# Patient Record
Sex: FEMALE | Race: WHITE | Employment: FULL TIME | URBAN - METROPOLITAN AREA
[De-identification: names, ages, dates, MRNs, and addresses within clinical notes are randomized per-mention and may not be internally consistent; named-entity substitution may affect disease eponyms.]

---

## 2017-01-03 ENCOUNTER — OFFICE VISIT (OUTPATIENT)
Dept: INTERNAL MEDICINE CLINIC | Facility: CLINIC | Age: 57
End: 2017-01-03

## 2017-01-03 VITALS
HEIGHT: 63 IN | HEART RATE: 60 BPM | RESPIRATION RATE: 16 BRPM | DIASTOLIC BLOOD PRESSURE: 64 MMHG | BODY MASS INDEX: 28.38 KG/M2 | TEMPERATURE: 99 F | SYSTOLIC BLOOD PRESSURE: 112 MMHG | WEIGHT: 160.19 LBS

## 2017-01-03 DIAGNOSIS — E03.9 HYPOTHYROIDISM, UNSPECIFIED TYPE: ICD-10-CM

## 2017-01-03 DIAGNOSIS — F41.9 ANXIETY: ICD-10-CM

## 2017-01-03 DIAGNOSIS — K60.2 ANAL FISSURE: ICD-10-CM

## 2017-01-03 DIAGNOSIS — Z87.898 HISTORY OF DIARRHEA: ICD-10-CM

## 2017-01-03 DIAGNOSIS — K59.00 CONSTIPATION, UNSPECIFIED CONSTIPATION TYPE: Primary | ICD-10-CM

## 2017-01-03 PROCEDURE — 99214 OFFICE O/P EST MOD 30 MIN: CPT | Performed by: FAMILY MEDICINE

## 2017-01-03 RX ORDER — LEVOTHYROXINE SODIUM 112 UG/1
TABLET ORAL
Qty: 90 TABLET | Refills: 2 | Status: SHIPPED | OUTPATIENT
Start: 2017-01-03 | End: 2017-09-25

## 2017-01-03 RX ORDER — ATORVASTATIN CALCIUM 40 MG/1
TABLET, FILM COATED ORAL
COMMUNITY
End: 2017-01-03

## 2017-01-03 RX ORDER — HYOSCYAMINE SULFATE 0.125 MG
125 TABLET ORAL
COMMUNITY
End: 2017-01-03

## 2017-01-03 RX ORDER — LEVOTHYROXINE SODIUM 175 UG/1
TABLET ORAL
COMMUNITY
End: 2017-01-03

## 2017-01-03 NOTE — PROGRESS NOTES
HPI:    Patient ID: Melecio Schuler is a 64year old female. HPI Here for f/u from ER visit to [facility] on 12/31 due to constipation and resulting fissure. Patient had been meaning to see GI as recommended for chronic diarrhea but did not make appt yet.  Had Disp: 36 mL Rfl: 0   Atorvastatin Calcium 10 MG Oral Tab Take 1 tablet (10 mg total) by mouth daily. Disp: 90 tablet Rfl: 1   aspirin 81 MG Oral Tab Take 81 mg by mouth daily. Disp:  Rfl:    Cholecalciferol (VITAMIN D OR) Take  by mouth.  Disp:  Rfl:      A

## 2017-01-03 NOTE — PATIENT INSTRUCTIONS
Eating a High-Fiber Diet  Fiber is what gives strength and structure to plants. Most grains, beans, vegetables, and fruits contain fiber. Foods rich in fiber are often low in calories and fat, and they fill you up more.  They may also reduce your risks fo Keep track of how much fiber you eat. Start by reading food labels. Then eat a variety of foods high in fiber.  As you begin to eat more fiber, ask your healthcare provider how much water you should be drinking to keep your digestive system working smoothly

## 2017-03-30 RX ORDER — ATORVASTATIN CALCIUM 10 MG/1
TABLET, FILM COATED ORAL
Qty: 90 TABLET | Refills: 2 | Status: SHIPPED | OUTPATIENT
Start: 2017-03-30 | End: 2017-11-27

## 2017-04-18 RX ORDER — DULOXETIN HYDROCHLORIDE 20 MG/1
CAPSULE, DELAYED RELEASE ORAL
Qty: 90 CAPSULE | Refills: 1 | Status: SHIPPED | OUTPATIENT
Start: 2017-04-18 | End: 2017-11-27

## 2017-09-25 RX ORDER — LEVOTHYROXINE SODIUM 112 UG/1
TABLET ORAL
Qty: 90 TABLET | Refills: 0 | Status: SHIPPED | OUTPATIENT
Start: 2017-09-25 | End: 2017-11-27

## 2017-10-16 PROCEDURE — 87624 HPV HI-RISK TYP POOLED RSLT: CPT | Performed by: OBSTETRICS & GYNECOLOGY

## 2017-10-16 PROCEDURE — 88175 CYTOPATH C/V AUTO FLUID REDO: CPT | Performed by: OBSTETRICS & GYNECOLOGY

## 2017-11-27 DIAGNOSIS — Z13.1 SCREENING FOR DIABETES MELLITUS (DM): ICD-10-CM

## 2017-11-27 DIAGNOSIS — Z13.0 SCREENING FOR DEFICIENCY ANEMIA: ICD-10-CM

## 2017-11-27 DIAGNOSIS — E03.9 HYPOTHYROIDISM, UNSPECIFIED TYPE: ICD-10-CM

## 2017-11-27 DIAGNOSIS — E78.5 HYPERLIPIDEMIA, UNSPECIFIED HYPERLIPIDEMIA TYPE: Primary | ICD-10-CM

## 2017-11-27 RX ORDER — DULOXETIN HYDROCHLORIDE 20 MG/1
20 CAPSULE, DELAYED RELEASE ORAL
Qty: 90 CAPSULE | Refills: 1 | Status: SHIPPED
Start: 2017-11-27 | End: 2018-12-27

## 2017-11-27 RX ORDER — LEVOTHYROXINE SODIUM 112 UG/1
112 TABLET ORAL
Qty: 90 TABLET | Refills: 0 | Status: SHIPPED
Start: 2017-11-27 | End: 2018-03-27

## 2017-11-27 RX ORDER — ATORVASTATIN CALCIUM 10 MG/1
10 TABLET, FILM COATED ORAL
Qty: 90 TABLET | Refills: 0 | Status: SHIPPED
Start: 2017-11-27 | End: 2018-03-27

## 2017-11-27 RX ORDER — MORPHINE 10 MG/ML
0.4 TINCTURE ORAL 3 TIMES DAILY PRN
Qty: 36 ML | Refills: 0
Start: 2017-11-27 | End: 2018-06-07

## 2017-11-27 NOTE — TELEPHONE ENCOUNTER
Medication(s) to Refill:   Pending Prescriptions Disp Refills    opium 10 MG/ML (1%) Oral Tincture 36 mL 0     Sig: Take 0.4 mL (4 mg total) by mouth 3 (three) times daily as needed.       atorvastatin 10 MG Oral Tab 90 tablet 2     Sig: Take 1 tablet (10 m 10/12/2016   LYMABS 1.83 10/12/2016   MOABSO 0.47 10/12/2016   EOABSO 0.09 10/12/2016   BAABSO 0.04 10/12/2016         Lab Results  Component Value Date   GLU 85 10/12/2016   BUN 15 10/12/2016   CREATSERUM 0.82 10/12/2016   GFR 80 10/12/2016   CA 9.2 10/12

## 2017-11-27 NOTE — TELEPHONE ENCOUNTER
Please call patient. She is due for fasting labs and office visit for further refills of her medication. Orders are in.

## 2017-11-27 NOTE — TELEPHONE ENCOUNTER
From: Didier Cough  Sent: 11/27/2017 12:16 PM CST  Subject: Medication Renewal Request    Ed Carcamo.  Shirlene Oneill would like a refill of the following medications:     opium 10 MG/ML (1%) Oral Tincture Link Hazard, DO]     ATORVASTATIN CALCIUM 10 MG Oral T

## 2017-12-20 ENCOUNTER — OFFICE VISIT (OUTPATIENT)
Dept: INTERNAL MEDICINE CLINIC | Facility: CLINIC | Age: 57
End: 2017-12-20

## 2017-12-20 ENCOUNTER — LAB ENCOUNTER (OUTPATIENT)
Dept: LAB | Age: 57
End: 2017-12-20
Attending: FAMILY MEDICINE
Payer: COMMERCIAL

## 2017-12-20 VITALS
SYSTOLIC BLOOD PRESSURE: 110 MMHG | TEMPERATURE: 98 F | BODY MASS INDEX: 27 KG/M2 | RESPIRATION RATE: 16 BRPM | HEART RATE: 70 BPM | WEIGHT: 155 LBS | DIASTOLIC BLOOD PRESSURE: 64 MMHG

## 2017-12-20 DIAGNOSIS — E03.9 HYPOTHYROIDISM, UNSPECIFIED TYPE: ICD-10-CM

## 2017-12-20 DIAGNOSIS — E78.5 HYPERLIPIDEMIA, UNSPECIFIED HYPERLIPIDEMIA TYPE: ICD-10-CM

## 2017-12-20 DIAGNOSIS — Z13.1 SCREENING FOR DIABETES MELLITUS (DM): ICD-10-CM

## 2017-12-20 DIAGNOSIS — Z00.00 ANNUAL PHYSICAL EXAM: Primary | ICD-10-CM

## 2017-12-20 PROCEDURE — 90686 IIV4 VACC NO PRSV 0.5 ML IM: CPT | Performed by: FAMILY MEDICINE

## 2017-12-20 PROCEDURE — 90471 IMMUNIZATION ADMIN: CPT | Performed by: FAMILY MEDICINE

## 2017-12-20 PROCEDURE — 99396 PREV VISIT EST AGE 40-64: CPT | Performed by: FAMILY MEDICINE

## 2017-12-20 PROCEDURE — 80061 LIPID PANEL: CPT | Performed by: FAMILY MEDICINE

## 2017-12-20 PROCEDURE — 84443 ASSAY THYROID STIM HORMONE: CPT | Performed by: FAMILY MEDICINE

## 2017-12-20 PROCEDURE — 80053 COMPREHEN METABOLIC PANEL: CPT | Performed by: FAMILY MEDICINE

## 2017-12-20 NOTE — PATIENT INSTRUCTIONS
Prevention Guidelines, Women Ages 48 to 59  Screening tests and vaccines are an important part of managing your health. Health counseling is essential, too. Below are guidelines for these, for women ages 48 to 59.  Talk with your healthcare provider to ma Lung cancer Adults age 54 to [de-identified] who have smoked Yearly screening in smokers with 30 pack-year history of smoking or who quit within 15 years   Obesity All women in this age group At routine exams   Osteoporosis Women who are postmenopausal Ask your healthc PPSV23: 1 to 2 doses through age 59, or 1 dose at 72 or older (protects against 23 types of pneumococcal bacteria)   Tetanus/diphtheria/pertussis (Td/Tdap) booster All women in this age group Td every 10 years, or a one-time dose of Tdap instead of a Td mattie

## 2017-12-21 NOTE — PROGRESS NOTES
HPI:    Patient ID: Dulce Patel is a 62year old female. HPI Here for annual check-up. Patient has Gyne and is up to date on pap and mammogram. Up to date on colonoscopy. Trying to eat healthy and exercise. Is busy with [address] work. No    Sexual activity: Not on file     Other Topics Concern   None on file     Social History Narrative   None on file     Family History   Problem Relation Age of Onset   • Heart Disorder Father      heart disease   • Breast Cancer Mother    • Heart Disea Cholecalciferol (VITAMIN D OR) Take  by mouth. Disp:  Rfl:      Allergies:No Known Allergies   PHYSICAL EXAM:   Physical Exam   Constitutional: She appears well-developed and well-nourished. HENT:   Head: Normocephalic and atraumatic.    Right Ear: Tymp

## 2018-03-27 NOTE — TELEPHONE ENCOUNTER
Medication(s) to Refill:   Pending Prescriptions Disp Refills    Levothyroxine Sodium 112 MCG Oral Tab 90 tablet 0     Sig: Take 1 tablet (112 mcg total) by mouth once daily.       atorvastatin 10 MG Oral Tab 90 tablet 0     Sig: Take 1 tablet (10 mg total)

## 2018-03-28 RX ORDER — LEVOTHYROXINE SODIUM 112 UG/1
112 TABLET ORAL
Qty: 90 TABLET | Refills: 2 | Status: SHIPPED | OUTPATIENT
Start: 2018-03-28 | End: 2018-12-27

## 2018-03-28 RX ORDER — ATORVASTATIN CALCIUM 10 MG/1
10 TABLET, FILM COATED ORAL
Qty: 90 TABLET | Refills: 2 | Status: SHIPPED | OUTPATIENT
Start: 2018-03-28 | End: 2018-12-27

## 2018-06-07 ENCOUNTER — TELEPHONE (OUTPATIENT)
Dept: INTERNAL MEDICINE CLINIC | Facility: CLINIC | Age: 58
End: 2018-06-07

## 2018-06-08 RX ORDER — MORPHINE 10 MG/ML
0.4 TINCTURE ORAL 3 TIMES DAILY PRN
Qty: 36 ML | Refills: 0 | Status: SHIPPED | OUTPATIENT
Start: 2018-06-08 | End: 2018-07-23

## 2018-07-12 ENCOUNTER — TELEPHONE (OUTPATIENT)
Dept: INTERNAL MEDICINE CLINIC | Facility: CLINIC | Age: 58
End: 2018-07-12

## 2018-07-12 NOTE — TELEPHONE ENCOUNTER
We did receive thru fax from patient the letter she states we can not sent it thru fax or electronically that it needs to be mailed in please advice. Placing letter in your basket.

## 2018-07-12 NOTE — TELEPHONE ENCOUNTER
Re opium 10 MG/ML (1%) Oral Tincture. . Needs refill, copy sent in June, was not accepted by [facility], Pt unaware why it was not accepted     Address if need to be mailed.      [facility] [address]  [address] [address] [facility], [address]    Pt will be faxing locio

## 2018-07-14 NOTE — TELEPHONE ENCOUNTER
Please call patient. It was my understanding when I first started seeing patient that she was going to f/u with GI regarding her Rx for tincture of opium and that if they felt she should continue taking it PRN then they would refill it.

## 2018-07-16 NOTE — TELEPHONE ENCOUNTER
Patient states she has been taking this medication for 10 years, ordered by her MD in [address] [place] [address], states she did see Dr. Zachery Rowe 2/2017 and told her to keep same management, doesn't have a 'working relationship' with him.   Pt states her PCP before [facility]

## 2018-07-23 RX ORDER — MORPHINE 10 MG/ML
0.6 TINCTURE ORAL 2 TIMES DAILY PRN
Qty: 36 ML | Refills: 0 | Status: SHIPPED | OUTPATIENT
Start: 2018-07-23 | End: 2018-08-22

## 2018-07-23 NOTE — TELEPHONE ENCOUNTER
Called pt back. Advised that she needs to  rx and paperwork. Pt stated she will arrange for a trip tomorrow, Tuesday, since she does not live close.

## 2018-07-23 NOTE — TELEPHONE ENCOUNTER
Please call patient. I printed Rx- she gave us paperwork that I am not sure what she wants us to do with the paperwork- it looks like she needs to  the Rx and paperwork and turn it in to the mail order herself.

## 2018-07-23 NOTE — TELEPHONE ENCOUNTER
Pt returned call re: rx for tincture of opium, says she is \"almost out of it\" and she [place] uses it once in a while, not every day\". She reports she only needs a rx for a year. Wants to know Rodrigo Hoffman this is taking 3-4 weeks to get approved\".   Also sta

## 2018-07-25 NOTE — TELEPHONE ENCOUNTER
Pt picked up RX and paperwork.  [facility]

## 2018-12-18 ENCOUNTER — TELEPHONE (OUTPATIENT)
Dept: INTERNAL MEDICINE CLINIC | Facility: CLINIC | Age: 58
End: 2018-12-18

## 2018-12-18 DIAGNOSIS — Z00.00 ROUTINE GENERAL MEDICAL EXAMINATION AT A HEALTH CARE FACILITY: Primary | ICD-10-CM

## 2018-12-18 NOTE — TELEPHONE ENCOUNTER
Future Appointments   Date Time Provider Donita Georges   12/27/2018 12:00 PM Maye Hint, DO [facility]     For CPE. Pt would like BW orders sent to [facility]. May wait until appt, will come in fasting.

## 2018-12-27 ENCOUNTER — OFFICE VISIT (OUTPATIENT)
Dept: INTERNAL MEDICINE CLINIC | Facility: CLINIC | Age: 58
End: 2018-12-27
Payer: COMMERCIAL

## 2018-12-27 ENCOUNTER — LABORATORY ENCOUNTER (OUTPATIENT)
Dept: LAB | Age: 58
End: 2018-12-27
Attending: FAMILY MEDICINE
Payer: COMMERCIAL

## 2018-12-27 VITALS
TEMPERATURE: 98 F | DIASTOLIC BLOOD PRESSURE: 84 MMHG | WEIGHT: 157 LBS | RESPIRATION RATE: 16 BRPM | SYSTOLIC BLOOD PRESSURE: 118 MMHG | HEART RATE: 70 BPM | BODY MASS INDEX: 27 KG/M2

## 2018-12-27 DIAGNOSIS — Z00.00 ANNUAL PHYSICAL EXAM: Primary | ICD-10-CM

## 2018-12-27 DIAGNOSIS — E03.9 HYPOTHYROIDISM, UNSPECIFIED TYPE: ICD-10-CM

## 2018-12-27 DIAGNOSIS — Z00.00 ROUTINE GENERAL MEDICAL EXAMINATION AT A HEALTH CARE FACILITY: ICD-10-CM

## 2018-12-27 DIAGNOSIS — E78.5 HYPERLIPIDEMIA, UNSPECIFIED HYPERLIPIDEMIA TYPE: ICD-10-CM

## 2018-12-27 PROCEDURE — 90471 IMMUNIZATION ADMIN: CPT | Performed by: FAMILY MEDICINE

## 2018-12-27 PROCEDURE — 80061 LIPID PANEL: CPT | Performed by: FAMILY MEDICINE

## 2018-12-27 PROCEDURE — 80050 GENERAL HEALTH PANEL: CPT | Performed by: FAMILY MEDICINE

## 2018-12-27 PROCEDURE — 99396 PREV VISIT EST AGE 40-64: CPT | Performed by: FAMILY MEDICINE

## 2018-12-27 PROCEDURE — 90686 IIV4 VACC NO PRSV 0.5 ML IM: CPT | Performed by: FAMILY MEDICINE

## 2018-12-27 RX ORDER — ATORVASTATIN CALCIUM 10 MG/1
10 TABLET, FILM COATED ORAL
Qty: 90 TABLET | Refills: 3 | Status: SHIPPED | OUTPATIENT
Start: 2018-12-27

## 2018-12-27 RX ORDER — LEVOTHYROXINE SODIUM 112 UG/1
112 TABLET ORAL
Qty: 90 TABLET | Refills: 3 | Status: SHIPPED | OUTPATIENT
Start: 2018-12-27

## 2018-12-27 RX ORDER — DULOXETIN HYDROCHLORIDE 20 MG/1
20 CAPSULE, DELAYED RELEASE ORAL
Qty: 90 CAPSULE | Refills: 1 | Status: SHIPPED | OUTPATIENT
Start: 2018-12-27

## 2018-12-27 NOTE — PROGRESS NOTES
HPI:    Patient ID: Francisco Zaidi is a 62year old female. HPI Here for annual check-up. Patient has no complaints. Eating healthy and exercising. Has Gyne and is aware she is due for mammogram. Up to date on colonoscopy.      Past Medical History:   D pain, hearing loss and rhinorrhea. Eyes: Negative for visual disturbance. Respiratory: Negative for cough and shortness of breath. Cardiovascular: Negative for chest pain, palpitations and leg swelling.    Gastrointestinal: Negative for abdominal pa rate, regular rhythm and normal heart sounds. Pulmonary/Chest: Effort normal and breath sounds normal.   Neurological: She is alert. Skin: Skin is warm and dry. Psychiatric: She has a normal mood and affect. Her behavior is normal.   Vitals reviewed.

## 2018-12-27 NOTE — PATIENT INSTRUCTIONS
Prevention Guidelines, Women Ages 48 to 59  Screening tests and vaccines are an important part of managing your health. A screening test is done to find possible disorders or diseases in people who don't have any symptoms.  The goal is to find a disease e Chlamydia Women at increased risk for infection At routine exams   Colorectal cancer All women in this age group Flexible sigmoidoscopy every 5 years, or colonoscopy every 10 years, or double-contrast barium enema every 5 years; yearly fecal occult blood t Hepatitis B Women at increased risk for infection – talk with your healthcare provider 3 doses over 6 months; second dose should be given 1 month after the first dose; the third dose should be given at least 2 months after the second dose and at least 4 mo Use of daily aspirin Women ages 54 and up in this age group who are at risk for cardiovascular health problems such as stroke When your risk is known   Use of tobacco and the health effects it can cause All women in this age group Every exam   [address]

## 2021-08-07 NOTE — TELEPHONE ENCOUNTER
Patient states she needs a refill of the following:    Opium 10 MG / oral tincture    She now uses [facility] due to [facility]. I've updated her chart. She states we have to use a specialty pharmacy through [facility] for this particular scrip. General

## (undated) NOTE — MR AVS SNAPSHOT
[facility] [address] [address] [phone]               Thank you for choosing us for your health care visit with Ashley Monroy DO.   We are glad to serve you and happy to provide you with this summa · Whole-grain breads and cereals. Try to eat 6 to 8 ounces a day. Include wheat and oat bran cereals, whole-wheat muffins or toast, and corn tortillas in your meals. · Fruits. Try to eat 2 cups a day.  Apples, oranges, strawberries, pears, and bananas are Return if symptoms worsen or fail to improve.       Allergies as of Jan 03, 2017     No Known Allergies                Today's Vital Signs     BP Pulse Temp Height Weight BMI    112/64 mmHg 60 98.6 °F (37 °C) (Oral) 63\" 160 lb 3.2 oz 28.39 kg/m2         C Return if symptoms worsen or fail to improve.          Referral Information     Referral Order Referred to Address [facility] Phone Visits Status Diagnosis                 MyChart     Visit MyChart  You can access your MyChart to more actively manage your health increments are effective and add up over the week   2 ½ hours per week – spread out over time Use a adele to keep you motivated   Don’t forget strength training with weights and resistance Set goals and track your progress   You don’t need to join a gym.